# Patient Record
Sex: FEMALE | Race: WHITE | NOT HISPANIC OR LATINO | URBAN - METROPOLITAN AREA
[De-identification: names, ages, dates, MRNs, and addresses within clinical notes are randomized per-mention and may not be internally consistent; named-entity substitution may affect disease eponyms.]

---

## 2018-10-01 ENCOUNTER — OUTPATIENT (OUTPATIENT)
Dept: OUTPATIENT SERVICES | Facility: HOSPITAL | Age: 68
LOS: 1 days | End: 2018-10-01
Payer: MEDICARE

## 2018-10-01 VITALS
WEIGHT: 141.1 LBS | DIASTOLIC BLOOD PRESSURE: 87 MMHG | OXYGEN SATURATION: 97 % | SYSTOLIC BLOOD PRESSURE: 142 MMHG | TEMPERATURE: 98 F | HEIGHT: 66 IN | HEART RATE: 88 BPM | RESPIRATION RATE: 16 BRPM

## 2018-10-01 DIAGNOSIS — D25.9 LEIOMYOMA OF UTERUS, UNSPECIFIED: ICD-10-CM

## 2018-10-01 DIAGNOSIS — I10 ESSENTIAL (PRIMARY) HYPERTENSION: ICD-10-CM

## 2018-10-01 DIAGNOSIS — Z98.890 OTHER SPECIFIED POSTPROCEDURAL STATES: Chronic | ICD-10-CM

## 2018-10-01 DIAGNOSIS — Z01.818 ENCOUNTER FOR OTHER PREPROCEDURAL EXAMINATION: ICD-10-CM

## 2018-10-01 DIAGNOSIS — Z90.89 ACQUIRED ABSENCE OF OTHER ORGANS: Chronic | ICD-10-CM

## 2018-10-01 LAB
ANION GAP SERPL CALC-SCNC: 7 MMOL/L — SIGNIFICANT CHANGE UP (ref 5–17)
BUN SERPL-MCNC: 19 MG/DL — SIGNIFICANT CHANGE UP (ref 7–23)
CALCIUM SERPL-MCNC: 9.6 MG/DL — SIGNIFICANT CHANGE UP (ref 8.5–10.1)
CHLORIDE SERPL-SCNC: 105 MMOL/L — SIGNIFICANT CHANGE UP (ref 96–108)
CO2 SERPL-SCNC: 30 MMOL/L — SIGNIFICANT CHANGE UP (ref 22–31)
CREAT SERPL-MCNC: 0.78 MG/DL — SIGNIFICANT CHANGE UP (ref 0.5–1.3)
GLUCOSE SERPL-MCNC: 118 MG/DL — HIGH (ref 70–99)
HCT VFR BLD CALC: 40.5 % — SIGNIFICANT CHANGE UP (ref 34.5–45)
HGB BLD-MCNC: 14.1 G/DL — SIGNIFICANT CHANGE UP (ref 11.5–15.5)
MCHC RBC-ENTMCNC: 30.7 PG — SIGNIFICANT CHANGE UP (ref 27–34)
MCHC RBC-ENTMCNC: 34.8 GM/DL — SIGNIFICANT CHANGE UP (ref 32–36)
MCV RBC AUTO: 88.2 FL — SIGNIFICANT CHANGE UP (ref 80–100)
NRBC # BLD: 0 /100 WBCS — SIGNIFICANT CHANGE UP (ref 0–0)
PLATELET # BLD AUTO: 187 K/UL — SIGNIFICANT CHANGE UP (ref 150–400)
POTASSIUM SERPL-MCNC: 4.1 MMOL/L — SIGNIFICANT CHANGE UP (ref 3.5–5.3)
POTASSIUM SERPL-SCNC: 4.1 MMOL/L — SIGNIFICANT CHANGE UP (ref 3.5–5.3)
RBC # BLD: 4.59 M/UL — SIGNIFICANT CHANGE UP (ref 3.8–5.2)
RBC # FLD: 12.2 % — SIGNIFICANT CHANGE UP (ref 10.3–14.5)
SODIUM SERPL-SCNC: 142 MMOL/L — SIGNIFICANT CHANGE UP (ref 135–145)
WBC # BLD: 6.14 K/UL — SIGNIFICANT CHANGE UP (ref 3.8–10.5)
WBC # FLD AUTO: 6.14 K/UL — SIGNIFICANT CHANGE UP (ref 3.8–10.5)

## 2018-10-01 PROCEDURE — 36415 COLL VENOUS BLD VENIPUNCTURE: CPT

## 2018-10-01 PROCEDURE — G0463: CPT

## 2018-10-01 PROCEDURE — 85027 COMPLETE CBC AUTOMATED: CPT

## 2018-10-01 PROCEDURE — 86901 BLOOD TYPING SEROLOGIC RH(D): CPT

## 2018-10-01 PROCEDURE — 93010 ELECTROCARDIOGRAM REPORT: CPT | Mod: NC

## 2018-10-01 PROCEDURE — 93005 ELECTROCARDIOGRAM TRACING: CPT

## 2018-10-01 PROCEDURE — 86900 BLOOD TYPING SEROLOGIC ABO: CPT

## 2018-10-01 PROCEDURE — 86850 RBC ANTIBODY SCREEN: CPT

## 2018-10-01 PROCEDURE — 80048 BASIC METABOLIC PNL TOTAL CA: CPT

## 2018-10-01 RX ORDER — ROSUVASTATIN CALCIUM 5 MG/1
0 TABLET ORAL
Qty: 90 | Refills: 0 | COMMUNITY

## 2018-10-01 NOTE — H&P PST ADULT - PMH
Fibroid, uterine    History of osteoarthritis    Hyperlipidemia    Hypertension  currently off meds X 4months  Vitamin D deficiency

## 2018-10-01 NOTE — H&P PST ADULT - VENOUS THROMBOEMBOLISM FOR WOMEN ONLY
Several attempts throughout the day to reach the patient for his INR of 1.4. Phone and cell phone are not in service or disconnected. Will continue to try and reach the patient.   none

## 2018-10-01 NOTE — H&P PST ADULT - HISTORY OF PRESENT ILLNESS
This is a 69 y/o female with PMH: HLD, This is a 67 y/o female with PMH: HLD, HTN: currently normotensive off meds past 4 months. Current dx: Fibroid Uterus. Scheduled: Total Abdominal Hysterectomy/ Bilateral Salpingo-opherctomy.

## 2018-10-01 NOTE — H&P PST ADULT - PROBLEM SELECTOR PLAN 1
Total Abdominal Hysterectomy/ Bilateral Salpingo-opherctomy  Labs: CBC,BMP, T+S, EKG, done.   Pre op and Hibiclens instructions reviewed and given. Take routine am meds DOS with sip of water. Avoid NSAIDs and OTC supplements. Verbalized understanding

## 2018-10-01 NOTE — H&P PST ADULT - BMI (KG/M2)
ESTABLISHED PATIENT PRE-VISIT PLANNING     Note: Patient will not be contacted if there is no indication to call.     1.  Reviewed notes from the last few office visits within the medical group: Yes    2.  If any orders were placed at last visit or intended to be done for this visit (i.e. 6 mos follow-up), do we have Results/Consult Notes?        •  Labs - Labs ordered, completed and results are in chart.       •  Imaging - Imaging was not ordered at last office visit.       •  Referrals - No referrals were ordered at last office visit.    3. Is this appointment scheduled as a Hospital Follow-Up? No    4.  Immunizations were updated in [employer] using WebIZ?: Yes       •  Web Iz Recommendations: HEPATITIS A  TDAP VARICELLA (Chicken Pox)     5.  Patient is due for the following Health Maintenance Topics:   Health Maintenance Due   Topic Date Due   • IMM DTaP/Tdap/Td Vaccine (1 - Tdap) 06/30/1974   • PAP SMEAR  06/30/1976   • MAMMOGRAM  06/30/1995   • IMM ZOSTER VACCINE  06/30/2015       - Patient has completed FLU Immunization(s) per WebIZ. Chart has been updated.    6.  Patient was NOT informed to arrive 15 min prior to their scheduled appointment and bring in their medication bottles.  
22.8

## 2018-10-01 NOTE — H&P PST ADULT - NSANTHOSAYNRD_GEN_A_CORE
No. RAFY screening performed.  STOP BANG Legend: 0-2 = LOW Risk; 3-4 = INTERMEDIATE Risk; 5-8 = HIGH Risk

## 2018-10-10 RX ORDER — CEFAZOLIN SODIUM 1 G
2000 VIAL (EA) INJECTION ONCE
Qty: 0 | Refills: 0 | Status: COMPLETED | OUTPATIENT
Start: 2018-10-11 | End: 2018-10-11

## 2018-10-10 RX ORDER — SODIUM CHLORIDE 9 MG/ML
1000 INJECTION, SOLUTION INTRAVENOUS
Qty: 0 | Refills: 0 | Status: DISCONTINUED | OUTPATIENT
Start: 2018-10-11 | End: 2018-10-11

## 2018-10-11 ENCOUNTER — INPATIENT (INPATIENT)
Facility: HOSPITAL | Age: 68
LOS: 2 days | Discharge: HOME | DRG: 743 | End: 2018-10-14
Payer: MEDICARE

## 2018-10-11 VITALS
HEIGHT: 66 IN | RESPIRATION RATE: 17 BRPM | SYSTOLIC BLOOD PRESSURE: 165 MMHG | OXYGEN SATURATION: 98 % | TEMPERATURE: 99 F | WEIGHT: 141.1 LBS | DIASTOLIC BLOOD PRESSURE: 87 MMHG | HEART RATE: 87 BPM

## 2018-10-11 DIAGNOSIS — Z90.89 ACQUIRED ABSENCE OF OTHER ORGANS: Chronic | ICD-10-CM

## 2018-10-11 DIAGNOSIS — D25.9 LEIOMYOMA OF UTERUS, UNSPECIFIED: ICD-10-CM

## 2018-10-11 DIAGNOSIS — Z98.890 OTHER SPECIFIED POSTPROCEDURAL STATES: Chronic | ICD-10-CM

## 2018-10-11 LAB
HCT VFR BLD CALC: 34.6 % — SIGNIFICANT CHANGE UP (ref 34.5–45)
HGB BLD-MCNC: 11.9 G/DL — SIGNIFICANT CHANGE UP (ref 11.5–15.5)
MCHC RBC-ENTMCNC: 30.5 PG — SIGNIFICANT CHANGE UP (ref 27–34)
MCHC RBC-ENTMCNC: 34.4 GM/DL — SIGNIFICANT CHANGE UP (ref 32–36)
MCV RBC AUTO: 88.7 FL — SIGNIFICANT CHANGE UP (ref 80–100)
NRBC # BLD: 0 /100 WBCS — SIGNIFICANT CHANGE UP (ref 0–0)
PLATELET # BLD AUTO: 155 K/UL — SIGNIFICANT CHANGE UP (ref 150–400)
RBC # BLD: 3.9 M/UL — SIGNIFICANT CHANGE UP (ref 3.8–5.2)
RBC # FLD: 12.1 % — SIGNIFICANT CHANGE UP (ref 10.3–14.5)
WBC # BLD: 18.46 K/UL — HIGH (ref 3.8–10.5)
WBC # FLD AUTO: 18.46 K/UL — HIGH (ref 3.8–10.5)

## 2018-10-11 PROCEDURE — 88307 TISSUE EXAM BY PATHOLOGIST: CPT | Mod: 26

## 2018-10-11 PROCEDURE — 88305 TISSUE EXAM BY PATHOLOGIST: CPT | Mod: 26

## 2018-10-11 RX ORDER — SODIUM CHLORIDE 9 MG/ML
1000 INJECTION, SOLUTION INTRAVENOUS
Qty: 0 | Refills: 0 | Status: DISCONTINUED | OUTPATIENT
Start: 2018-10-11 | End: 2018-10-12

## 2018-10-11 RX ORDER — SODIUM CHLORIDE 9 MG/ML
1000 INJECTION, SOLUTION INTRAVENOUS
Qty: 0 | Refills: 0 | Status: DISCONTINUED | OUTPATIENT
Start: 2018-10-11 | End: 2018-10-11

## 2018-10-11 RX ORDER — ONDANSETRON 8 MG/1
4 TABLET, FILM COATED ORAL EVERY 6 HOURS
Qty: 0 | Refills: 0 | Status: DISCONTINUED | OUTPATIENT
Start: 2018-10-11 | End: 2018-10-14

## 2018-10-11 RX ORDER — CHOLECALCIFEROL (VITAMIN D3) 125 MCG
1 CAPSULE ORAL
Qty: 0 | Refills: 0 | COMMUNITY

## 2018-10-11 RX ORDER — ONDANSETRON 8 MG/1
4 TABLET, FILM COATED ORAL ONCE
Qty: 0 | Refills: 0 | Status: COMPLETED | OUTPATIENT
Start: 2018-10-11 | End: 2018-10-11

## 2018-10-11 RX ORDER — GENTAMICIN SULFATE 40 MG/ML
80 VIAL (ML) INJECTION EVERY 8 HOURS
Qty: 0 | Refills: 0 | Status: DISCONTINUED | OUTPATIENT
Start: 2018-10-11 | End: 2018-10-14

## 2018-10-11 RX ORDER — ROSUVASTATIN CALCIUM 5 MG/1
1 TABLET ORAL
Qty: 0 | Refills: 0 | COMMUNITY

## 2018-10-11 RX ORDER — CEFAZOLIN SODIUM 1 G
1000 VIAL (EA) INJECTION EVERY 8 HOURS
Qty: 0 | Refills: 0 | Status: DISCONTINUED | OUTPATIENT
Start: 2018-10-11 | End: 2018-10-14

## 2018-10-11 RX ORDER — METOCLOPRAMIDE HCL 10 MG
10 TABLET ORAL ONCE
Qty: 0 | Refills: 0 | Status: COMPLETED | OUTPATIENT
Start: 2018-10-11 | End: 2018-10-11

## 2018-10-11 RX ORDER — OXYCODONE HYDROCHLORIDE 5 MG/1
5 TABLET ORAL ONCE
Qty: 0 | Refills: 0 | Status: DISCONTINUED | OUTPATIENT
Start: 2018-10-11 | End: 2018-10-11

## 2018-10-11 RX ORDER — KETOROLAC TROMETHAMINE 30 MG/ML
15 SYRINGE (ML) INJECTION EVERY 6 HOURS
Qty: 0 | Refills: 0 | Status: DISCONTINUED | OUTPATIENT
Start: 2018-10-11 | End: 2018-10-13

## 2018-10-11 RX ORDER — HYDROMORPHONE HYDROCHLORIDE 2 MG/ML
0.5 INJECTION INTRAMUSCULAR; INTRAVENOUS; SUBCUTANEOUS
Qty: 0 | Refills: 0 | Status: DISCONTINUED | OUTPATIENT
Start: 2018-10-11 | End: 2018-10-11

## 2018-10-11 RX ORDER — ATORVASTATIN CALCIUM 80 MG/1
20 TABLET, FILM COATED ORAL AT BEDTIME
Qty: 0 | Refills: 0 | Status: DISCONTINUED | OUTPATIENT
Start: 2018-10-11 | End: 2018-10-14

## 2018-10-11 RX ORDER — INFLUENZA VIRUS VACCINE 15; 15; 15; 15 UG/.5ML; UG/.5ML; UG/.5ML; UG/.5ML
0.5 SUSPENSION INTRAMUSCULAR ONCE
Qty: 0 | Refills: 0 | Status: COMPLETED | OUTPATIENT
Start: 2018-10-11 | End: 2018-10-14

## 2018-10-11 RX ORDER — ENOXAPARIN SODIUM 100 MG/ML
40 INJECTION SUBCUTANEOUS EVERY 24 HOURS
Qty: 0 | Refills: 0 | Status: DISCONTINUED | OUTPATIENT
Start: 2018-10-12 | End: 2018-10-14

## 2018-10-11 RX ADMIN — ONDANSETRON 4 MILLIGRAM(S): 8 TABLET, FILM COATED ORAL at 16:25

## 2018-10-11 RX ADMIN — SODIUM CHLORIDE 75 MILLILITER(S): 9 INJECTION, SOLUTION INTRAVENOUS at 07:29

## 2018-10-11 RX ADMIN — HYDROMORPHONE HYDROCHLORIDE 0.5 MILLIGRAM(S): 2 INJECTION INTRAMUSCULAR; INTRAVENOUS; SUBCUTANEOUS at 16:05

## 2018-10-11 RX ADMIN — Medication 200 MILLIGRAM(S): at 18:10

## 2018-10-11 RX ADMIN — SODIUM CHLORIDE 75 MILLILITER(S): 9 INJECTION, SOLUTION INTRAVENOUS at 16:08

## 2018-10-11 RX ADMIN — Medication 100 MILLIGRAM(S): at 22:44

## 2018-10-11 RX ADMIN — Medication 100 MILLIGRAM(S): at 22:43

## 2018-10-11 RX ADMIN — Medication 10 MILLIGRAM(S): at 16:51

## 2018-10-11 RX ADMIN — ATORVASTATIN CALCIUM 20 MILLIGRAM(S): 80 TABLET, FILM COATED ORAL at 22:45

## 2018-10-11 RX ADMIN — HYDROMORPHONE HYDROCHLORIDE 0.5 MILLIGRAM(S): 2 INJECTION INTRAMUSCULAR; INTRAVENOUS; SUBCUTANEOUS at 16:34

## 2018-10-11 NOTE — BRIEF OPERATIVE NOTE - SPECIMENS
uterus, cervix, fallopian tubes and ovaries and large 12cm fibroid arising from the left round ligament

## 2018-10-11 NOTE — BRIEF OPERATIVE NOTE - OPERATION/FINDINGS
small normal uterus with 12 cm intraligamentous fibroid in the left broad ligament  normal tubes and ovaries  ureters could not be adequately identified and  was called for cystoscopy and the placement of ureteral stents.  ureters were intact and away from the surgical site.

## 2018-10-11 NOTE — BRIEF OPERATIVE NOTE - PROCEDURE
<<-----Click on this checkbox to enter Procedure Cystoscopy  10/11/2018  with insertion of Bilateral ureteral stents  Active  MARC SMITH (total abdominal hysterectomy and bilateral salpingo-oophorectomy)  10/11/2018    Active  MARC

## 2018-10-11 NOTE — BRIEF OPERATIVE NOTE - POST-OP DX
Uterine leiomyoma, unspecified location  10/11/2018  left broad ligament-12 cm  Active  Lucina Mckeon

## 2018-10-12 RX ORDER — MAGNESIUM HYDROXIDE 400 MG/1
30 TABLET, CHEWABLE ORAL ONCE
Qty: 0 | Refills: 0 | Status: COMPLETED | OUTPATIENT
Start: 2018-10-12 | End: 2018-10-12

## 2018-10-12 RX ADMIN — Medication 100 MILLIGRAM(S): at 13:38

## 2018-10-12 RX ADMIN — ATORVASTATIN CALCIUM 20 MILLIGRAM(S): 80 TABLET, FILM COATED ORAL at 21:47

## 2018-10-12 RX ADMIN — Medication 100 MILLIGRAM(S): at 05:20

## 2018-10-12 RX ADMIN — Medication 100 MILLIGRAM(S): at 21:46

## 2018-10-12 RX ADMIN — ONDANSETRON 4 MILLIGRAM(S): 8 TABLET, FILM COATED ORAL at 18:02

## 2018-10-12 RX ADMIN — ENOXAPARIN SODIUM 40 MILLIGRAM(S): 100 INJECTION SUBCUTANEOUS at 17:32

## 2018-10-12 RX ADMIN — Medication 15 MILLIGRAM(S): at 00:30

## 2018-10-12 RX ADMIN — MAGNESIUM HYDROXIDE 30 MILLILITER(S): 400 TABLET, CHEWABLE ORAL at 18:05

## 2018-10-12 RX ADMIN — Medication 100 MILLIGRAM(S): at 13:37

## 2018-10-12 RX ADMIN — Medication 15 MILLIGRAM(S): at 00:00

## 2018-10-12 RX ADMIN — Medication 15 MILLIGRAM(S): at 11:45

## 2018-10-12 RX ADMIN — Medication 15 MILLIGRAM(S): at 05:20

## 2018-10-12 RX ADMIN — Medication 15 MILLIGRAM(S): at 11:13

## 2018-10-12 RX ADMIN — Medication 15 MILLIGRAM(S): at 17:32

## 2018-10-12 RX ADMIN — Medication 100 MILLIGRAM(S): at 21:47

## 2018-10-12 RX ADMIN — Medication 15 MILLIGRAM(S): at 17:50

## 2018-10-12 RX ADMIN — Medication 15 MILLIGRAM(S): at 06:30

## 2018-10-13 DIAGNOSIS — Z09 ENCOUNTER FOR FOLLOW-UP EXAMINATION AFTER COMPLETED TREATMENT FOR CONDITIONS OTHER THAN MALIGNANT NEOPLASM: ICD-10-CM

## 2018-10-13 LAB
ALBUMIN SERPL ELPH-MCNC: 2.7 G/DL — LOW (ref 3.3–5)
ALP SERPL-CCNC: 51 U/L — SIGNIFICANT CHANGE UP (ref 40–120)
ALT FLD-CCNC: 16 U/L — SIGNIFICANT CHANGE UP (ref 12–78)
ANION GAP SERPL CALC-SCNC: 4 MMOL/L — LOW (ref 5–17)
AST SERPL-CCNC: 19 U/L — SIGNIFICANT CHANGE UP (ref 15–37)
BILIRUB SERPL-MCNC: 1.4 MG/DL — HIGH (ref 0.2–1.2)
BUN SERPL-MCNC: 17 MG/DL — SIGNIFICANT CHANGE UP (ref 7–23)
CALCIUM SERPL-MCNC: 8.4 MG/DL — LOW (ref 8.5–10.1)
CHLORIDE SERPL-SCNC: 105 MMOL/L — SIGNIFICANT CHANGE UP (ref 96–108)
CO2 SERPL-SCNC: 33 MMOL/L — HIGH (ref 22–31)
CREAT SERPL-MCNC: 0.78 MG/DL — SIGNIFICANT CHANGE UP (ref 0.5–1.3)
GENTAMICIN TROUGH SERPL-MCNC: 1.1 UG/ML — SIGNIFICANT CHANGE UP (ref 0–2)
GLUCOSE SERPL-MCNC: 97 MG/DL — SIGNIFICANT CHANGE UP (ref 70–99)
HCT VFR BLD CALC: 29.4 % — LOW (ref 34.5–45)
HGB BLD-MCNC: 10 G/DL — LOW (ref 11.5–15.5)
MCHC RBC-ENTMCNC: 30.7 PG — SIGNIFICANT CHANGE UP (ref 27–34)
MCHC RBC-ENTMCNC: 34 GM/DL — SIGNIFICANT CHANGE UP (ref 32–36)
MCV RBC AUTO: 90.2 FL — SIGNIFICANT CHANGE UP (ref 80–100)
NRBC # BLD: 0 /100 WBCS — SIGNIFICANT CHANGE UP (ref 0–0)
PLATELET # BLD AUTO: 130 K/UL — LOW (ref 150–400)
POTASSIUM SERPL-MCNC: 4 MMOL/L — SIGNIFICANT CHANGE UP (ref 3.5–5.3)
POTASSIUM SERPL-SCNC: 4 MMOL/L — SIGNIFICANT CHANGE UP (ref 3.5–5.3)
PROT SERPL-MCNC: 5.7 G/DL — LOW (ref 6–8.3)
RBC # BLD: 3.26 M/UL — LOW (ref 3.8–5.2)
RBC # FLD: 12.6 % — SIGNIFICANT CHANGE UP (ref 10.3–14.5)
SODIUM SERPL-SCNC: 142 MMOL/L — SIGNIFICANT CHANGE UP (ref 135–145)
WBC # BLD: 10.38 K/UL — SIGNIFICANT CHANGE UP (ref 3.8–10.5)
WBC # FLD AUTO: 10.38 K/UL — SIGNIFICANT CHANGE UP (ref 3.8–10.5)

## 2018-10-13 RX ORDER — CEPHALEXIN 500 MG
1 CAPSULE ORAL
Qty: 28 | Refills: 0 | OUTPATIENT
Start: 2018-10-13 | End: 2018-10-19

## 2018-10-13 RX ORDER — ACETAMINOPHEN 500 MG
2 TABLET ORAL
Qty: 0 | Refills: 0 | COMMUNITY

## 2018-10-13 RX ORDER — IBUPROFEN 200 MG
3 TABLET ORAL
Qty: 0 | Refills: 0 | COMMUNITY

## 2018-10-13 RX ORDER — IBUPROFEN 200 MG
600 TABLET ORAL EVERY 6 HOURS
Qty: 0 | Refills: 0 | Status: DISCONTINUED | OUTPATIENT
Start: 2018-10-13 | End: 2018-10-14

## 2018-10-13 RX ADMIN — Medication 15 MILLIGRAM(S): at 01:10

## 2018-10-13 RX ADMIN — Medication 15 MILLIGRAM(S): at 03:00

## 2018-10-13 RX ADMIN — Medication 600 MILLIGRAM(S): at 23:08

## 2018-10-13 RX ADMIN — Medication 600 MILLIGRAM(S): at 12:15

## 2018-10-13 RX ADMIN — Medication 15 MILLIGRAM(S): at 05:44

## 2018-10-13 RX ADMIN — Medication 100 MILLIGRAM(S): at 21:32

## 2018-10-13 RX ADMIN — Medication 600 MILLIGRAM(S): at 17:15

## 2018-10-13 RX ADMIN — ENOXAPARIN SODIUM 40 MILLIGRAM(S): 100 INJECTION SUBCUTANEOUS at 17:16

## 2018-10-13 RX ADMIN — Medication 100 MILLIGRAM(S): at 05:44

## 2018-10-13 RX ADMIN — Medication 600 MILLIGRAM(S): at 23:53

## 2018-10-13 RX ADMIN — Medication 100 MILLIGRAM(S): at 13:56

## 2018-10-13 RX ADMIN — Medication 600 MILLIGRAM(S): at 11:13

## 2018-10-13 RX ADMIN — Medication 100 MILLIGRAM(S): at 17:16

## 2018-10-13 RX ADMIN — ATORVASTATIN CALCIUM 20 MILLIGRAM(S): 80 TABLET, FILM COATED ORAL at 21:32

## 2018-10-13 NOTE — DISCHARGE NOTE ADULT - INSTRUCTIONS
any persistent abdominal pain; nausea ; vomitting; fever and chest pain call your docotr or go to ER.

## 2018-10-13 NOTE — DISCHARGE NOTE ADULT - CARE PLAN
Principal Discharge DX:	Uterine leiomyoma, unspecified location  Goal:	TAHBSO  Assessment and plan of treatment:	routine postop care and instructions. F/U in the office in 1week

## 2018-10-13 NOTE — DISCHARGE NOTE ADULT - CARE PROVIDER_API CALL
Lucina Mckeon), Obstetrics and Gynecology  [address] [place] [address]  Phone: [phone]  Fax: [phone]

## 2018-10-13 NOTE — DISCHARGE NOTE ADULT - NS AS ACTIVITY OBS
Stairs allowed/Do not make important decisions/Do not drive or operate machinery/Walking-Outdoors allowed/Walking-Indoors allowed/Showering allowed/No Heavy lifting/straining

## 2018-10-13 NOTE — DISCHARGE NOTE ADULT - MEDICATION SUMMARY - MEDICATIONS TO TAKE
I will START or STAY ON the medications listed below when I get home from the hospital:    ibuprofen 200 mg oral tablet  -- 3 tab(s) by mouth every 6 hours  -- Indication: For Pain    Tylenol 325 mg oral tablet  -- 2 tab(s) by mouth every 6 hours, As Needed  not to exceed 4000mg/24 hours  -- Indication: For Pain    ROSUVASTATIN TAB 5MG  -- 1  by mouth once a day  -- Indication: For cholesterol    cephalexin 500 mg oral tablet  -- 1 tab(s) by mouth 4 times a day   -- Finish all this medication unless otherwise directed by prescriber.    -- Indication: For antibiotics    Vitamin D3 1000 intl units oral tablet  -- 1 tab(s) by mouth once a day  -- Indication: For Vitamin D deficiency

## 2018-10-13 NOTE — DISCHARGE NOTE ADULT - PATIENT PORTAL LINK FT
You can access the [facility] Portal, offered by [facility], by registering with the following website: [email or web address]

## 2018-10-13 NOTE — DISCHARGE NOTE ADULT - HOSPITAL COURSE
69 y/o W/F G0 admitted with large fibroid uterus for TAHBSO. At the time of surgery a large fibroid was found in the left round ligament with a small fibroid uterus. This fibroid was carefully dissected free from the surrounding tissues and TAHBSO was successfully performed. In view of extensive dissection  was called to place ureteral stents to affirm that ureters were not transected during the procedure. The stents were passed easily and palpated intra abdominally.

## 2018-10-14 VITALS
DIASTOLIC BLOOD PRESSURE: 75 MMHG | SYSTOLIC BLOOD PRESSURE: 147 MMHG | TEMPERATURE: 98 F | RESPIRATION RATE: 16 BRPM | OXYGEN SATURATION: 98 % | HEART RATE: 66 BPM

## 2018-10-14 LAB
HCT VFR BLD CALC: 33.9 % — LOW (ref 34.5–45)
HGB BLD-MCNC: 11.3 G/DL — LOW (ref 11.5–15.5)
MCHC RBC-ENTMCNC: 30.5 PG — SIGNIFICANT CHANGE UP (ref 27–34)
MCHC RBC-ENTMCNC: 33.3 GM/DL — SIGNIFICANT CHANGE UP (ref 32–36)
MCV RBC AUTO: 91.4 FL — SIGNIFICANT CHANGE UP (ref 80–100)
NRBC # BLD: 0 /100 WBCS — SIGNIFICANT CHANGE UP (ref 0–0)
PLATELET # BLD AUTO: 155 K/UL — SIGNIFICANT CHANGE UP (ref 150–400)
RBC # BLD: 3.71 M/UL — LOW (ref 3.8–5.2)
RBC # FLD: 12.3 % — SIGNIFICANT CHANGE UP (ref 10.3–14.5)
WBC # BLD: 7.67 K/UL — SIGNIFICANT CHANGE UP (ref 3.8–10.5)
WBC # FLD AUTO: 7.67 K/UL — SIGNIFICANT CHANGE UP (ref 3.8–10.5)

## 2018-10-14 RX ADMIN — Medication 100 MILLIGRAM(S): at 05:12

## 2018-10-14 RX ADMIN — Medication 100 MILLIGRAM(S): at 06:10

## 2018-10-14 RX ADMIN — INFLUENZA VIRUS VACCINE 0.5 MILLILITER(S): 15; 15; 15; 15 SUSPENSION INTRAMUSCULAR at 09:57

## 2018-10-14 RX ADMIN — Medication 600 MILLIGRAM(S): at 06:14

## 2018-10-14 RX ADMIN — Medication 600 MILLIGRAM(S): at 05:13

## 2018-10-14 NOTE — PROGRESS NOTE ADULT - SUBJECTIVE AND OBJECTIVE BOX
KATHERINE PALOMARES is a 68y woman who is s/p: Uterine leiomyoma  TAHBSO (total abdominal hysterectomy and bilateral salpingo-oophorectomy)  TAHBSO (total abdominal hysterectomy and bilateral salpingo-oophorectomy)  Cystoscopy  Ureteral stent placement, intraoperatively (not at tx)  Cystoscopy      She is now postoperative day: 3    Subjective:  The patient feels well.  She is ambulating and tolerating a diet  She is having bowel movements and flatus  She reports no breathing problems  She reports no headache or visual changes    Physical exam:  She generally looks and feels well    Vital Signs Last 24 Hrs  T(C): 36.9 (14 Oct 2018 08:11), Max: 37.1 (13 Oct 2018 23:30)  T(F): 98.4 (14 Oct 2018 08:11), Max: 98.7 (13 Oct 2018 23:30)  HR: 66 (14 Oct 2018 08:11) (53 - 84)  BP: 147/75 (14 Oct 2018 08:11) (106/81 - 157/79)  BP(mean): --  RR: 16 (14 Oct 2018 08:11) (16 - 17)  SpO2: 98% (14 Oct 2018 08:11) (94% - 98%)    Lungs: Clear to A+P  Heart: Regular rate and rhythm, no Murmur  Abdomen: Soft, nontender, no distension   Incision:  clean, dry and intact, slightly eccymotic  Pelvic: Normal vaginal spotting noted  Ext: No DVT signs, warm and dry extremities, normal pulses    LABS:                        11.3   7.67  )-----------( 155      ( 14 Oct 2018 09:10 )             33.9     10-13    142  |  105  |  17  ----------------------------<  97  4.0   |  33<H>  |  0.78    Ca    8.4<L>      13 Oct 2018 07:51    TPro  5.7<L>  /  Alb  2.7<L>  /  TBili  1.4<H>  /  DBili  x   /  AST  19  /  ALT  16  /  AlkPhos  51  10-13          Allergies    No Known Allergies    Intolerances      MEDICATIONS  (STANDING):  atorvastatin 20 milliGRAM(s) Oral at bedtime  ceFAZolin   IVPB 1000 milliGRAM(s) IV Intermittent every 8 hours  enoxaparin Injectable 40 milliGRAM(s) SubCutaneous every 24 hours  gentamicin   IVPB 80 milliGRAM(s) IV Intermittent every 8 hours  ibuprofen  Tablet. 600 milliGRAM(s) Oral every 6 hours  influenza   Vaccine 0.5 milliLiter(s) IntraMuscular once    MEDICATIONS  (PRN):  ondansetron Injectable 4 milliGRAM(s) IV Push every 6 hours PRN Nausea and/or Vomiting      RADIOLOGY & ADDITIONAL TESTS:    Assessment and Plan    Abdominal Hysterectomy Day:  She feels well  Continue the current pain medication  Encourage  Ambulation  Encourage regular diet  DVT ppx: SCDs when not ambulating  She is stable, tolerates a diet and has normal flatus and bowel movements  She will be discharged today according to the normal criteria.
DIGNA PALOMARES is a 68y woman who is s/p: Uterine leiomyoma  TAHBSO (total abdominal hysterectomy and bilateral salpingo-oophorectomy)  Cystoscopy with insertion of ureteral catheters by Dr Cartwright()      She is now postoperative day: 1    Subjective:  The patient feels well.  She is tolerating a diet.  She is not having bowel movements and flatus  She reports no breathing problems  She reports no headache or visual changes    Physical exam:  She generally looks and feels well    Vital Signs Last 24 Hrs  T(C): 36.8 (12 Oct 2018 07:34), Max: 37.6 (12 Oct 2018 03:08)  T(F): 98.3 (12 Oct 2018 07:34), Max: 99.6 (12 Oct 2018 03:08)  HR: 57 (12 Oct 2018 07:34) (49 - 73)  BP: 102/61 (12 Oct 2018 07:34) (102/61 - 136/76)  BP(mean): --  RR: 16 (12 Oct 2018 07:34) (11 - 18)  SpO2: 99% (12 Oct 2018 07:34) (98% - 100%)    Lungs: Clear to A+P  Heart: Regular rate and rhythm, no Murmur  Abdomen: Soft, nontender, no distension   Incision:  clean, dry and intact, bandage was removed  Pelvic: Normal vaginal spotting noted  Ext: No DVT signs, warm and dry extremities, normal pulses  López with bloody urine and ureteral catheters draining bloody urine    LABS:                        11.9   18.46 )-----------( 155      ( 11 Oct 2018 18:29 )             34.6       Allergies    No Known Allergies    Intolerances      MEDICATIONS  (STANDING):  atorvastatin 20 milliGRAM(s) Oral at bedtime  ceFAZolin   IVPB 1000 milliGRAM(s) IV Intermittent every 8 hours  enoxaparin Injectable 40 milliGRAM(s) SubCutaneous every 24 hours  gentamicin   IVPB 80 milliGRAM(s) IV Intermittent every 8 hours  influenza   Vaccine 0.5 milliLiter(s) IntraMuscular once  ketorolac   Injectable 15 milliGRAM(s) IV Push every 6 hours    MEDICATIONS  (PRN):  ondansetron Injectable 4 milliGRAM(s) IV Push every 6 hours PRN Nausea and/or Vomiting      RADIOLOGY & ADDITIONAL TESTS:    Assessment and Plan    Abdominal Hysterectomy Day: 1  She feels well  Continue the current pain medication  Encourage  Ambulation  Encourage regular diet and to drink plenty of fuids  lópez to remain in place, left ureteral catheter has been removed  DVT ppx: SCDs when not ambulating  She is stable, tolerates a diet but no flatus or bowel movements  She will be discharged on Day 2 or 3   according to the normal criteria.
KATHERINE PALOMARES is a 68y woman who is s/p: Uterine leiomyoma  TAHBSO (total abdominal hysterectomy and bilateral salpingo-oophorectomy)  TAHBSO (total abdominal hysterectomy and bilateral salpingo-oophorectomy)  Cystoscopy  Ureteral stent placement, intraoperatively (not at tx)  Cystoscopy      She is now postoperative day: 2    Subjective:  The patient feels well.  She is ambulating and tolerating a diet  She not passing gas or BM. She was given MOM yesterday  She reports no breathing problems  She reports no headache or visual changes    Physical exam:  She generally looks and feels well    Vital Signs Last 24 Hrs  T(C): 37.7 (13 Oct 2018 08:48), Max: 37.7 (13 Oct 2018 08:48)  T(F): 99.8 (13 Oct 2018 08:48), Max: 99.8 (13 Oct 2018 08:48)  HR: 82 (13 Oct 2018 08:48) (58 - 82)  BP: 103/63 (13 Oct 2018 08:48) (97/57 - 119/66)  BP(mean): --  RR: 18 (13 Oct 2018 08:48) (16 - 18)  SpO2: 95% (13 Oct 2018 08:48) (94% - 100%)    Lungs: Clear to A+P  Heart: Regular rate and rhythm, no Murmur  Abdomen: Soft, nontender, no distension   Incision:  clean, dry and intact  Pelvic: Normal vaginal spotting noted  Ext: No DVT signs, warm and dry extremities, normal pulses  López with blood tinged urine in the tube but red in the bag in both right ureteral catheter and lópez bags. Both were removed by me this am.    LABS:                        10.0   10.38 )-----------( 130      ( 13 Oct 2018 07:50 )             29.4     10-13    142  |  105  |  17  ----------------------------<  97  4.0   |  33<H>  |  0.78    Ca    8.4<L>      13 Oct 2018 07:51    TPro  5.7<L>  /  Alb  2.7<L>  /  TBili  1.4<H>  /  DBili  x   /  AST  19  /  ALT  16  /  AlkPhos  51  10-13      Allergies    No Known Allergies    Intolerances      MEDICATIONS  (STANDING):  atorvastatin 20 milliGRAM(s) Oral at bedtime  ceFAZolin   IVPB 1000 milliGRAM(s) IV Intermittent every 8 hours  enoxaparin Injectable 40 milliGRAM(s) SubCutaneous every 24 hours  gentamicin   IVPB 80 milliGRAM(s) IV Intermittent every 8 hours  ibuprofen  Tablet. 600 milliGRAM(s) Oral every 6 hours  influenza   Vaccine 0.5 milliLiter(s) IntraMuscular once    MEDICATIONS  (PRN):  ondansetron Injectable 4 milliGRAM(s) IV Push every 6 hours PRN Nausea and/or Vomiting      RADIOLOGY & ADDITIONAL TESTS:    Assessment and Plan    Abdominal Hysterectomy Day:  She feels well  Toradol IVP has been d/c and po motrin 600mg started q6h in its place  Encourage  Ambulation  Encourage regular diet and increased fluids.  DVT ppx: SCDs when not ambulating  She is stable, tolerates a diet. López and ureteral stent( right) have been removed. Both lópez and ureteral bags contained 300 ml of bloody urine.  pt will be observed one more day because of extensive surgery with significant blood lose. H/H 10/29.4 (14/40)  She will be discharged on Day 2,3 or 4   according to the normal criteria.

## 2018-10-14 NOTE — PROGRESS NOTE ADULT - PROBLEM SELECTOR PLAN 1
doing well with no compliant. D/C home with f/u in the office on Thursday or Friday of next week. Instructions given.
stable, lópez and ureteral stent(rt) have been removed.   pt will remain in house with possible d/c  on 10/14.

## 2018-10-16 LAB — SURGICAL PATHOLOGY FINAL REPORT - CH: SIGNIFICANT CHANGE UP

## 2018-11-11 PROCEDURE — 88307 TISSUE EXAM BY PATHOLOGIST: CPT

## 2018-11-11 PROCEDURE — 85027 COMPLETE CBC AUTOMATED: CPT

## 2018-11-11 PROCEDURE — 82962 GLUCOSE BLOOD TEST: CPT

## 2018-11-11 PROCEDURE — 88305 TISSUE EXAM BY PATHOLOGIST: CPT

## 2018-11-11 PROCEDURE — C1758: CPT

## 2018-11-11 PROCEDURE — C1889: CPT

## 2018-11-11 PROCEDURE — 80170 ASSAY OF GENTAMICIN: CPT

## 2018-11-11 PROCEDURE — 90686 IIV4 VACC NO PRSV 0.5 ML IM: CPT

## 2018-11-11 PROCEDURE — 80053 COMPREHEN METABOLIC PANEL: CPT
